# Patient Record
Sex: FEMALE | Race: BLACK OR AFRICAN AMERICAN | NOT HISPANIC OR LATINO | Employment: FULL TIME | URBAN - METROPOLITAN AREA
[De-identification: names, ages, dates, MRNs, and addresses within clinical notes are randomized per-mention and may not be internally consistent; named-entity substitution may affect disease eponyms.]

---

## 2023-04-05 ENCOUNTER — OFFICE VISIT (OUTPATIENT)
Dept: PRIMARY CARE | Facility: CLINIC | Age: 28
End: 2023-04-05
Payer: COMMERCIAL

## 2023-04-05 VITALS
BODY MASS INDEX: 18.63 KG/M2 | DIASTOLIC BLOOD PRESSURE: 77 MMHG | SYSTOLIC BLOOD PRESSURE: 115 MMHG | HEART RATE: 100 BPM | WEIGHT: 95.4 LBS | TEMPERATURE: 99 F | OXYGEN SATURATION: 98 %

## 2023-04-05 DIAGNOSIS — M25.512 ACUTE PAIN OF LEFT SHOULDER: ICD-10-CM

## 2023-04-05 DIAGNOSIS — Z3A.01 LESS THAN 8 WEEKS GESTATION OF PREGNANCY (HHS-HCC): ICD-10-CM

## 2023-04-05 DIAGNOSIS — Z11.3 ROUTINE SCREENING FOR STI (SEXUALLY TRANSMITTED INFECTION): Primary | ICD-10-CM

## 2023-04-05 LAB
POC APPEARANCE, URINE: CLEAR
POC BILIRUBIN, URINE: ABNORMAL
POC BLOOD, URINE: ABNORMAL
POC COLOR, URINE: YELLOW
POC GLUCOSE, URINE: NEGATIVE MG/DL
POC KETONES, URINE: ABNORMAL MG/DL
POC LEUKOCYTES, URINE: ABNORMAL
POC NITRITE,URINE: NEGATIVE
POC PH, URINE: 5.5 PH
POC PROTEIN, URINE: ABNORMAL MG/DL
POC SPECIFIC GRAVITY, URINE: >=1.03
POC UROBILINOGEN, URINE: 1 EU/DL
PREGNANCY TEST URINE, POC: POSITIVE

## 2023-04-05 PROCEDURE — 1036F TOBACCO NON-USER: CPT

## 2023-04-05 PROCEDURE — 99213 OFFICE O/P EST LOW 20 MIN: CPT

## 2023-04-05 PROCEDURE — 81025 URINE PREGNANCY TEST: CPT

## 2023-04-05 PROCEDURE — 87491 CHLMYD TRACH DNA AMP PROBE: CPT

## 2023-04-05 PROCEDURE — 87591 N.GONORRHOEAE DNA AMP PROB: CPT

## 2023-04-05 PROCEDURE — 81002 URINALYSIS NONAUTO W/O SCOPE: CPT

## 2023-04-05 RX ORDER — ALBUTEROL SULFATE 90 UG/1
AEROSOL, METERED RESPIRATORY (INHALATION)
COMMUNITY
Start: 2017-02-28

## 2023-04-05 ASSESSMENT — PAIN SCALES - GENERAL: PAINLEVEL: 5

## 2023-04-05 NOTE — PROGRESS NOTES
Subjective   Patient ID:   Juan Truong is a 28 y.o. female who presents for Abdominal Pain.  HPI  She reported that her LMP was 3/15/2023 and stated that she had unprotected intercourse on 3/25/2023. She reported that she usually takes a Plan B after but did not take one this time. She stated that she has been nauseous but denies any vomiting or abdominal pain. She reported that she was diagnosed with BV in the ED earlier in the year but was unable to finish her Flagyl course due to the vomiting. She took a pregnancy test at home which was positive. She stated that she had her last child a year ago and stated that she would not like to continue with this pregnancy and has already contacted the  clinic.     She also complained of some left shoulder pain that started a few months go. She reported that the pain comes and goes. 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 She describes it as pain behind and around her shoulder with weakness compared to her right arm. She has tried icy hot patches but has had little relief. Her ROM is limited by pain       Review of Systems   Constitutional:  Negative for appetite change, chills and fatigue.   Respiratory:  Negative for shortness of breath.    Cardiovascular:  Negative for chest pain.   Gastrointestinal:  Positive for nausea. Negative for abdominal pain, diarrhea and vomiting.       Objective   /77   Pulse 100   Temp 37.2 °C (99 °F)   Wt 43.3 kg (95 lb 6.4 oz)   SpO2 98%   BMI 18.63 kg/m²    Physical Exam  Constitutional:       General: She is not in acute distress.     Appearance: Normal appearance.   Cardiovascular:      Rate and Rhythm: Normal rate and regular rhythm.      Heart sounds: Normal heart sounds. No murmur heard.  Pulmonary:      Effort: Pulmonary effort is normal. No respiratory distress.      Breath sounds: Normal breath sounds.   Abdominal:      General: There is no distension.       Palpations: Abdomen is soft.      Tenderness: There is no abdominal tenderness. There is no guarding.   Musculoskeletal:         General: Normal range of motion.      Comments: ROM intact but limited due to pain, Rotor cuff maneuvers negative   Skin:     General: Skin is warm and dry.   Neurological:      General: No focal deficit present.      Mental Status: She is alert.   Psychiatric:         Mood and Affect: Mood normal.         Behavior: Behavior normal.         Assessment/Plan     Problem List Items Addressed This Visit    None  Ms. Truong is a 28 year old  who presents to the clinic for positive pregnancy test and left shoulder pain.    #Pregnancy  - IO HCG test positive  - IO UA showed trace LE and negative nitrites   - Ordered chlamydia & gonorrhea to screen  - Given handout with information on next steps of pregnancy and available options    - Suggested starting a prenatal vitamin as a precaution if patient decides to keep pregnancy    #Left shoulder pain  - Likely due to muscle strain  - Use ice, rest, & Tylenol as needed  - Referred for PT for pain/weakness  - Given a work note to allow patient to rest and recover     RTC in 3 weeks for a follow up visit    The patient was discussed with Dr. Iqbal.    Lukas Corrales MD  Family Medicine   PGY-1

## 2023-04-05 NOTE — LETTER
April 5, 2023     Patient: Juan Truong   YOB: 1995   Date of Visit: 4/5/2023       To Whom It May Concern:    Juan Truong was seen in my clinic on 4/5/2023 at 2:00 pm. Please excuse Juan for her absence from work on 4/6/2023 to 4/13/2023. She also has a follow up appointment in 3 weeks that is to be scheduled.    If you have any questions or concerns, please don't hesitate to call.         Sincerely,         Lukas Corrales MD        CC: No Recipients

## 2023-04-06 LAB
CHLAMYDIA TRACH., AMPLIFIED: NEGATIVE
N. GONORRHEA, AMPLIFIED: NEGATIVE

## 2023-04-10 ENCOUNTER — TELEPHONE (OUTPATIENT)
Dept: PRIMARY CARE | Facility: CLINIC | Age: 28
End: 2023-04-10
Payer: COMMERCIAL

## 2023-04-10 ASSESSMENT — ENCOUNTER SYMPTOMS
VOMITING: 0
DIARRHEA: 0
ABDOMINAL PAIN: 0
FATIGUE: 0
NAUSEA: 1
CHILLS: 0
APPETITE CHANGE: 0
SHORTNESS OF BREATH: 0

## 2023-04-10 NOTE — TELEPHONE ENCOUNTER
Result Communication    Resulted Orders   POCT Pregnancy, Urine manually resulted   Result Value Ref Range    Preg Test, Ur Positive (A) Negative   C. Trachomatis / N. Gonorrhoeae, Amplified Detection   Result Value Ref Range    Neisseria gonorrhea,Amplified NEGATIVE Negative      Comment:       The APTIMA Combo 2 assay is FDA-approved for Chlamydia    trachomatis and Neisseria gonorrhoeae testing on female    endocervical and vaginal swabs, ThinPrep liquid pap    samples, male urine samples and urethral swabs.    Performance characteristics for Chlamydia trachomatis and    Neisseria gonorrhoeae testing on specific non-FDA-approved    sample types (female urine samples) have been validated by    [facility]. This    laboratory is certified by CLIA to perform high complexity    testing. Samples from all other sites are not validated    for this method.    Chlamydia trachomatis, Amplified NEGATIVE Negative      Comment:       The APTIMA Combo 2 assay is FDA-approved for Chlamydia    trachomatis and Neisseria gonorrhoeae testing on female    endocervical and vaginal swabs, ThinPrep liquid pap    samples, male urine samples and urethral swabs.    Performance characteristics for Chlamydia trachomatis and    Neisseria gonorrhoeae testing on specific non-FDA-approved    sample types (female urine samples) have been validated by    [facility]. This    laboratory is certified by CLIA to perform high complexity    testing. Samples from all other sites are not validated    for this method.   POCT UA (nonautomated) manually resulted   Result Value Ref Range    POC Color, Urine Yellow Straw, Yellow, Light Yellow    POC Appearance, Urine Clear Clear    POC Specific Gravity, Urine >=1.030 1.005 - 1.035    POC PH, Urine 5.5 No Reference Range Established PH    POC Protein, Urine TRACE (A) NEGATIVE, 30 (1+) mg/dl    POC Glucose, Urine NEGATIVE NEGATIVE mg/dl    POC  Blood, Urine MODERATE (2+) (A) NEGATIVE    POC Ketones, Urine TRACE (A) NEGATIVE mg/dl    POC Bilirubin, Urine SMALL (1+) (A) NEGATIVE    POC Urobilinogen, Urine 1.0 0.2, 1.0 EU/DL    Poc Nitrate, Urine NEGATIVE NEGATIVE    POC Leukocytes, Urine TRACE (A) NEGATIVE       4:33 PM      Results were successfully communicated with the patient and they acknowledged their understanding.  The patient reported that she had sat down with the FOB and discussed the pregnancy and reported that she is thinking of keeping it but is still unsure at the moment. She requested some prenatal vitamins which I sent to her pharmacy. I told her to schedule a follow up appointment regardless of which decision she makes.

## 2023-04-13 NOTE — PROGRESS NOTES
I saw and evaluated the patient. I personally obtained the key and critical portions of the history and physical exam or was physically present for key and critical portions performed by the resident/fellow. I reviewed the resident/fellow's documentation and discussed the patient with the resident/fellow. I agree with the resident/fellow's medical decision making as documented in the note.  Referred to .    Renae Iqbal MD

## 2023-09-28 ENCOUNTER — OFFICE VISIT (OUTPATIENT)
Dept: PRIMARY CARE | Facility: CLINIC | Age: 28
End: 2023-09-28
Payer: COMMERCIAL

## 2023-09-28 VITALS
BODY MASS INDEX: 19.24 KG/M2 | HEIGHT: 60 IN | TEMPERATURE: 98.2 F | SYSTOLIC BLOOD PRESSURE: 110 MMHG | OXYGEN SATURATION: 99 % | WEIGHT: 98 LBS | HEART RATE: 89 BPM | DIASTOLIC BLOOD PRESSURE: 75 MMHG

## 2023-09-28 DIAGNOSIS — Z30.09 BIRTH CONTROL COUNSELING: ICD-10-CM

## 2023-09-28 DIAGNOSIS — L30.9 ECZEMA, UNSPECIFIED TYPE: ICD-10-CM

## 2023-09-28 DIAGNOSIS — Z11.3 ROUTINE SCREENING FOR STI (SEXUALLY TRANSMITTED INFECTION): Primary | ICD-10-CM

## 2023-09-28 DIAGNOSIS — B35.4 RINGWORM OF BODY: ICD-10-CM

## 2023-09-28 DIAGNOSIS — N76.0 BACTERIAL VAGINOSIS: ICD-10-CM

## 2023-09-28 DIAGNOSIS — T78.40XA ALLERGY, INITIAL ENCOUNTER: ICD-10-CM

## 2023-09-28 DIAGNOSIS — B96.89 BACTERIAL VAGINOSIS: ICD-10-CM

## 2023-09-28 DIAGNOSIS — Z3A.01 LESS THAN 8 WEEKS GESTATION OF PREGNANCY (HHS-HCC): ICD-10-CM

## 2023-09-28 LAB — PREGNANCY TEST URINE, POC: POSITIVE

## 2023-09-28 PROCEDURE — 87491 CHLMYD TRACH DNA AMP PROBE: CPT

## 2023-09-28 PROCEDURE — 1036F TOBACCO NON-USER: CPT

## 2023-09-28 PROCEDURE — 99395 PREV VISIT EST AGE 18-39: CPT

## 2023-09-28 PROCEDURE — 87661 TRICHOMONAS VAGINALIS AMPLIF: CPT

## 2023-09-28 PROCEDURE — 87591 N.GONORRHOEAE DNA AMP PROB: CPT

## 2023-09-28 PROCEDURE — 81025 URINE PREGNANCY TEST: CPT

## 2023-09-28 RX ORDER — KETOCONAZOLE 20 MG/G
CREAM TOPICAL 2 TIMES DAILY
Qty: 30 G | Refills: 0 | Status: SHIPPED | OUTPATIENT
Start: 2023-09-28 | End: 2023-10-28

## 2023-09-28 RX ORDER — KETOCONAZOLE 20 MG/ML
SHAMPOO, SUSPENSION TOPICAL 2 TIMES WEEKLY
Qty: 120 ML | Refills: 0 | Status: SHIPPED | OUTPATIENT
Start: 2023-09-28

## 2023-09-28 RX ORDER — METRONIDAZOLE 500 MG/1
500 TABLET ORAL 2 TIMES DAILY
Qty: 14 TABLET | Refills: 0 | Status: SHIPPED | OUTPATIENT
Start: 2023-09-28 | End: 2023-10-05

## 2023-09-28 RX ORDER — BETAMETHASONE VALERATE 1 MG/G
CREAM TOPICAL 2 TIMES DAILY
Qty: 15 G | Refills: 5 | Status: SHIPPED | OUTPATIENT
Start: 2023-09-28 | End: 2024-09-27

## 2023-09-28 RX ORDER — EPINEPHRINE 0.3 MG/.3ML
1 INJECTION SUBCUTANEOUS AS NEEDED
Qty: 1 EACH | Refills: 3 | Status: SHIPPED | OUTPATIENT
Start: 2023-09-28 | End: 2024-09-27

## 2023-09-28 ASSESSMENT — PATIENT HEALTH QUESTIONNAIRE - PHQ9
1. LITTLE INTEREST OR PLEASURE IN DOING THINGS: NOT AT ALL
SUM OF ALL RESPONSES TO PHQ9 QUESTIONS 1 AND 2: 0
2. FEELING DOWN, DEPRESSED OR HOPELESS: NOT AT ALL

## 2023-09-28 ASSESSMENT — ENCOUNTER SYMPTOMS
FEVER: 0
SHORTNESS OF BREATH: 0
FATIGUE: 0
BLOOD IN STOOL: 0
DIAPHORESIS: 0
NUMBNESS: 0
EYE PAIN: 0
ABDOMINAL DISTENTION: 0
COLOR CHANGE: 0
CHEST TIGHTNESS: 0
EYE ITCHING: 0
COUGH: 0
SINUS PRESSURE: 0
HEADACHES: 0
VOMITING: 0
NERVOUS/ANXIOUS: 1
OCCASIONAL FEELINGS OF UNSTEADINESS: 0
DYSURIA: 0
BACK PAIN: 0
AGITATION: 0
LOSS OF SENSATION IN FEET: 0
DIARRHEA: 0
ABDOMINAL PAIN: 0
JOINT SWELLING: 0
NAUSEA: 0
DEPRESSION: 0
FREQUENCY: 0
WEAKNESS: 0
RHINORRHEA: 0
NECK STIFFNESS: 0
FLANK PAIN: 0
TREMORS: 0
WHEEZING: 0
CONSTIPATION: 0
SINUS PAIN: 0
CONFUSION: 0
EYE DISCHARGE: 0
DIZZINESS: 0
NECK PAIN: 0
LIGHT-HEADEDNESS: 0

## 2023-09-28 ASSESSMENT — COLUMBIA-SUICIDE SEVERITY RATING SCALE - C-SSRS
1. IN THE PAST MONTH, HAVE YOU WISHED YOU WERE DEAD OR WISHED YOU COULD GO TO SLEEP AND NOT WAKE UP?: NO
2. HAVE YOU ACTUALLY HAD ANY THOUGHTS OF KILLING YOURSELF?: NO
6. HAVE YOU EVER DONE ANYTHING, STARTED TO DO ANYTHING, OR PREPARED TO DO ANYTHING TO END YOUR LIFE?: NO

## 2023-09-28 ASSESSMENT — PAIN SCALES - GENERAL: PAINLEVEL: 0-NO PAIN

## 2023-09-28 NOTE — PROGRESS NOTES
Subjective   Patient ID: Juan Truong is a 28 y.o. female who presents for Annual Exam.    HPI   # Body rash  - Reports rash on both arms, legs, thigh, & abdomen  - Started a few months ago and has never had a similar rash before  - Describes as small round dry patches   - Sometimes itchy, Non-tender  - Denies any recent changes to detergents, soaps, or clothes  - Has not tried anything for it    #Wrist nodule  - Nodule present on dorsum of the wrist  - Reports it has been present for the last few months  - Sometimes painful and not related to any strenuous activities  - Reports that it has been about the same size but sometimes gets bigger and shrinks back down  - Never had it before    # Vaginal Discharge  - Thick white discharge  - No odor, itching or UTI symptoms  - Believes she has Bacterial vaginosis as she had similar symptoms during an episode in the past    #Med refill  - Reported that she has not needed to use her epipen but the ones she has at home are  and needs a refill    #HM  - Social Hx: Never smoked, drinks alcohol socially, Denies other drug use. Lives with her 3 children at home, Childrens father is incarcerated. Feels safe at home, has family close by for support. Works as a [employer]  - Sexual Hx: Active with 1 male partner and uses protection sometimes. Positive pregnancy test at last visit and reported that she underwent an . Reports she is interested in birth control. Would like to be screened for STIs  - PHQ-2: Scored a 2 - negative, reports that speaking with her ex makes her feel down/angry  - Food Insecurity - answered no      Review of Systems   Constitutional:  Negative for diaphoresis, fatigue and fever.   HENT:  Negative for ear discharge, ear pain, mouth sores, postnasal drip, rhinorrhea, sinus pressure, sinus pain, sneezing and tinnitus.    Eyes:  Negative for pain, discharge and itching.   Respiratory:  Negative for cough, chest tightness, shortness of breath and  wheezing.    Gastrointestinal:  Negative for abdominal distention, abdominal pain, blood in stool, constipation, diarrhea, nausea and vomiting.   Endocrine: Positive for cold intolerance. Negative for heat intolerance.   Genitourinary:  Negative for dyspareunia, dysuria, flank pain, frequency, pelvic pain and urgency.   Musculoskeletal:  Negative for back pain, gait problem, joint swelling, neck pain and neck stiffness.   Skin:  Positive for rash. Negative for color change and pallor.   Neurological:  Negative for dizziness, tremors, syncope, weakness, light-headedness, numbness and headaches.   Psychiatric/Behavioral:  Negative for agitation, confusion, self-injury and suicidal ideas. The patient is nervous/anxious.        Objective   Ht 1.524 m (5')   Wt (!) 44.5 kg (98 lb)   BMI 19.14 kg/m²     Physical Exam    General: NAD.  Neck: No lymphadenopathy.  CV: RRR. Normal S1/S2. No murmurs, rubs, gallops.  Pulm: Clear.  Abd: Nontender, nondistended, NBS.  MSK: Full range of motiong, No joint redness, swelling, tendersness. 3 x 3 cm cyst on dorsum of right wrist, nontender to palpation  Keila: No tingling, numbness, sensory loss.  Skin: Multiple round dry patches. Little to no redness. Distributed on arms, groin, buttock, legs, face.      Assessment/Plan   Juan Truong is a 28 year old female who presents to the clinic for a  visit.    #Body Rash  - Likely fungal etiology with some eczema  - Ordered Ketoconazole Shampoo & cream  - Ordered betamethasone cream    #Ganglion cyst  - Continue to monitor  - Consider draining if symptoms progress    #BV  - Ordered Flagyl BID for 7 days    #Med refill  - Refilled Epipen    #HM  - Ordered STI screening; Chlamydia, gonorrhea, HIV, syphilis, & trichomonas  - Discussed different options of birth control, patient declined IUD, Depo, & Nexplanon but is interested in starting OCP as she has used it in the past.  - POCT HCG test positive, discussed options but patient expressed  that she does not have any plans to keep the baby. Plans to schedule appointment with Prenatal who she saw earlier in the year.   - Ordered Prenatal vitamins and plan to order OCP once patient is no longer pregnant  - Patient declined annual flu shot    The patient was seen and discussed with Dr. Washburn.    Robert Turcios, MS3    The history and physical were conducted with a medical student and I agree with the assessment and plan above.    Lukas Corrales MD  Family Medicine  PGY-2

## 2023-09-29 LAB
CHLAMYDIA TRACH., AMPLIFIED: NEGATIVE
N. GONORRHEA, AMPLIFIED: NEGATIVE
TRICHOMONAS VAGINALIS: NEGATIVE

## 2023-10-02 ENCOUNTER — TELEPHONE (OUTPATIENT)
Dept: PRIMARY CARE | Facility: CLINIC | Age: 28
End: 2023-10-02
Payer: COMMERCIAL

## 2023-10-02 NOTE — TELEPHONE ENCOUNTER
Result Communication    Resulted Orders   POCT Pregnancy, Urine manually resulted   Result Value Ref Range    Preg Test, Ur Positive (A) Negative   C. trachomatis + N. gonorrhoeae, Amplified   Result Value Ref Range    Neisseria gonorrhea,Amplified NEGATIVE Negative      Comment:       The APTIMA Combo 2 assay is FDA-approved for Chlamydia    trachomatis and Neisseria gonorrhoeae testing on female    endocervical and vaginal swabs, ThinPrep liquid pap    samples, male urine samples and urethral swabs.    Performance characteristics for Chlamydia trachomatis and    Neisseria gonorrhoeae testing on specific non-FDA-approved    sample types (female urine samples) have been validated by    [facility]. This    laboratory is certified by CLIA to perform high complexity    testing. Samples from all other sites are not validated    for this method.    Chlamydia trachomatis, Amplified NEGATIVE Negative      Comment:       The APTIMA Combo 2 assay is FDA-approved for Chlamydia    trachomatis and Neisseria gonorrhoeae testing on female    endocervical and vaginal swabs, ThinPrep liquid pap    samples, male urine samples and urethral swabs.    Performance characteristics for Chlamydia trachomatis and    Neisseria gonorrhoeae testing on specific non-FDA-approved    sample types (female urine samples) have been validated by    [facility]. This    laboratory is certified by CLIA to perform high complexity    testing. Samples from all other sites are not validated    for this method.   Trichomonas vaginalis, Amplified   Result Value Ref Range    Trichomonas Vaginalis NEGATIVE Negative      Comment:       The APTIMA Trichomonas vaginalis assay is FDA-approved for    testing on female endocervical swabs, vaginal swabs, and    ThinPrep liquid pap samples. Performance characteristics    for Trichomonas vaginalis on specific non-FDA-approved    sample types (female and  male urine and male urethral    swabs) have been validated by [facility]. This laboratory is certified by    CLIA to perform high complexity testing. Samples from all    other sites are not validated for this method.  Performance characteristics for Trichomonas Vaginalis testing on urine   samples has been validated by [facility].  Testing on   this sample type is not FDA-approved, but such approval is not necessary.   This laboratory is certified by CLIA to perform high complexity testing.       6:55 PM      Results were successfully communicated with the patient and they acknowledged their understanding.

## 2023-10-06 NOTE — PROGRESS NOTES
I saw and evaluated the patient. I personally obtained the key and critical portions of the history and physical exam or was physically present for key and critical portions performed by the resident/fellow. I reviewed the resident/fellow's documentation and discussed the patient with the resident/fellow. I agree with the resident/fellow's medical decision making as documented in the note.    Johann Washburn MD

## 2024-07-14 ENCOUNTER — HOSPITAL ENCOUNTER (EMERGENCY)
Facility: HOSPITAL | Age: 29
Discharge: HOME | End: 2024-07-14
Payer: COMMERCIAL

## 2024-07-14 VITALS
DIASTOLIC BLOOD PRESSURE: 85 MMHG | TEMPERATURE: 98.6 F | RESPIRATION RATE: 18 BRPM | HEART RATE: 80 BPM | WEIGHT: 102 LBS | OXYGEN SATURATION: 99 % | SYSTOLIC BLOOD PRESSURE: 122 MMHG | HEIGHT: 60 IN | BODY MASS INDEX: 20.03 KG/M2

## 2024-07-14 DIAGNOSIS — Z3A.01 LESS THAN 8 WEEKS GESTATION OF PREGNANCY (HHS-HCC): Primary | ICD-10-CM

## 2024-07-14 DIAGNOSIS — Z20.2 POSSIBLE EXPOSURE TO STD: ICD-10-CM

## 2024-07-14 LAB — HCG UR QL IA.RAPID: POSITIVE

## 2024-07-14 PROCEDURE — 87661 TRICHOMONAS VAGINALIS AMPLIF: CPT | Mod: AHULAB | Performed by: PHYSICIAN ASSISTANT

## 2024-07-14 PROCEDURE — 87491 CHLMYD TRACH DNA AMP PROBE: CPT | Mod: AHULAB | Performed by: PHYSICIAN ASSISTANT

## 2024-07-14 PROCEDURE — 81025 URINE PREGNANCY TEST: CPT | Performed by: PHYSICIAN ASSISTANT

## 2024-07-14 PROCEDURE — 99283 EMERGENCY DEPT VISIT LOW MDM: CPT

## 2024-07-14 ASSESSMENT — PAIN - FUNCTIONAL ASSESSMENT: PAIN_FUNCTIONAL_ASSESSMENT: 0-10

## 2024-07-14 ASSESSMENT — COLUMBIA-SUICIDE SEVERITY RATING SCALE - C-SSRS
6. HAVE YOU EVER DONE ANYTHING, STARTED TO DO ANYTHING, OR PREPARED TO DO ANYTHING TO END YOUR LIFE?: NO
2. HAVE YOU ACTUALLY HAD ANY THOUGHTS OF KILLING YOURSELF?: NO
1. IN THE PAST MONTH, HAVE YOU WISHED YOU WERE DEAD OR WISHED YOU COULD GO TO SLEEP AND NOT WAKE UP?: NO

## 2024-07-14 ASSESSMENT — PAIN SCALES - GENERAL: PAINLEVEL_OUTOF10: 0 - NO PAIN

## 2024-07-14 NOTE — ED PROVIDER NOTES
"HPI     CC: pregnancy test     HPI: Juan Truong is a 29 y.o. female with no past medical history presents with concern for wanting a pregnancy test.  Patient is slightly evasive to questioning and does not wish to tell me why.  She will not give me correct dates for last menstrual period but states that it was last month.  Later she shows that her last menstrual period was .  She has not had any bleeding or spotting.  Reports that this would be 6 pregnancy with 3 living children.  She does report that about 6 months ago she went to  and had an  completed.  She also asked for \"anything that can be tested with my urine to be tested.\"  Denies any urinary symptoms or vaginal discharge.    ROS: 10-point review of systems was performed and is otherwise negative except as noted in HPI.      Past Medical History: Noncontributory except per HPI     Past Surgical History: Noncontributory except per HPI     Family History: Reviewed and noncontributory     Social History:  Noncontributory except per HPI       Allergies   Allergen Reactions    Peanut Anaphylaxis    Chocolate Flavor Unknown    Cocoa Hives    Other Hives     peanut butter       Home Meds:   Current Outpatient Medications   Medication Instructions    ALBUTEROL 0.375 MG/ML CONT NEB SYR-SIMPLE inhalation    albuterol 90 mcg/actuation inhaler inhalation    betamethasone valerate (Valisone) 0.1 % cream Topical, 2 times daily, Apply to affected area    EPINEPHrine (EPIPEN) 0.3 mg, intramuscular, As needed, Call 911 after use.    ketoconazole (NIZOral) 2 % shampoo Topical, 2 times weekly, Shampoo daily, leave on for 5-10 minutes, then rinse.    prenatal vitamin, iron-folic, 27 mg iron-800 mcg folic acid tablet 1 tablet, oral, Daily        ED Triage Vitals [24 1023]   Temperature Heart Rate Respirations BP   37 °C (98.6 °F) 80 18 122/85      Pulse Ox Temp Source Heart Rate Source Patient Position   99 % Temporal Monitor Sitting      BP " Location FiO2 (%)     Left arm --         Heart Rate:  [80]   Temperature:  [37 °C (98.6 °F)]   Respirations:  [18]   BP: (122)/(85)   Height:  [152.4 cm (5')]   Weight:  [46.3 kg (102 lb)]   Pulse Ox:  [99 %]      Physical Exam:  Physical Exam  Vitals and nursing note reviewed.   Constitutional:       General: She is not in acute distress.     Appearance: Normal appearance. She is not ill-appearing.   HENT:      Head: Normocephalic.      Right Ear: External ear normal.      Left Ear: External ear normal.      Nose: Nose normal.      Mouth/Throat:      Mouth: Mucous membranes are moist.   Eyes:      Extraocular Movements: Extraocular movements intact.      Conjunctiva/sclera: Conjunctivae normal.      Pupils: Pupils are equal, round, and reactive to light.   Cardiovascular:      Rate and Rhythm: Normal rate and regular rhythm.      Pulses: Normal pulses.   Pulmonary:      Effort: Pulmonary effort is normal. No respiratory distress.   Abdominal:      General: Abdomen is flat.   Musculoskeletal:         General: Normal range of motion.      Cervical back: Normal range of motion and neck supple.   Skin:     General: Skin is warm and dry.      Capillary Refill: Capillary refill takes less than 2 seconds.   Neurological:      General: No focal deficit present.      Mental Status: She is alert and oriented to person, place, and time.   Psychiatric:         Mood and Affect: Mood normal.          Diagnostic Results        Labs Reviewed   HCG, URINE, QUALITATIVE - Abnormal       Result Value    HCG, Urine POSITIVE (*)    C. TRACHOMATIS + N. GONORRHOEAE, AMPLIFIED   TRICH VAGINALIS, AMPLIFIED         No orders to display                 No data recorded                Procedure  Procedures    ED Course & MDM   Assessment/Plan:     Medications - No data to display     Diagnoses as of 07/14/24 1226   Less than 8 weeks gestation of pregnancy ([facility])   Possible exposure to STD       Medical Decision Making    Juan Truong is  a 29 y.o. female with no significant past medical history presents with concern for pregnancy test.  Patient is nontoxic-appearing and her vital signs are normal.  With very brief history taken, differential diagnosis includes pregnancy, STDs.  Patient has no urinary symptoms so deferred urinalysis at this time.  Will obtain pregnancy test and urine test for chlamydia, gonorrhea, and trichomonas.  Urine pregnancy positive.  Patient notified.  She states she is going to go to .  We discussed that she is probably about 4 weeks gestational age given last menstrual period.  Since she has no acute symptoms at this time, do not feel that ultrasound would be of any assistance.  Patient states she is making an appointment and does not plan to keep this baby.  We discussed that her STD results will not come back today but that a post discharge nurse will call her within a few days.  If these are positive she will need treatment which she is aware of.  Does not wish to take any today since her pregnancy test did come back positive.  She also has low concern for STDs at this time.  Advised her to refrain from sexual activity over the next 7 days to avoid spreading of possible STDs.  Recommended that she return to the emergency department for any new or worsening symptoms.  Patient agreeable to plan of care and felt comfortable returning home.    Disposition: Home    ED Prescriptions    None         Social Determinants Affecting Care: none     Stella Morrell PA-C    This note was dictated by speech recognition. Minor errors in transcription may be present.     Stella Morrell PA-C  [id number]

## 2024-07-15 LAB
C TRACH RRNA SPEC QL NAA+PROBE: NEGATIVE
N GONORRHOEA DNA SPEC QL PROBE+SIG AMP: NEGATIVE
T VAGINALIS RRNA SPEC QL NAA+PROBE: NEGATIVE

## 2024-07-19 ENCOUNTER — APPOINTMENT (OUTPATIENT)
Dept: RADIOLOGY | Facility: HOSPITAL | Age: 29
End: 2024-07-19
Payer: COMMERCIAL

## 2024-07-19 ENCOUNTER — HOSPITAL ENCOUNTER (EMERGENCY)
Facility: HOSPITAL | Age: 29
Discharge: HOME | End: 2024-07-19
Attending: EMERGENCY MEDICINE
Payer: COMMERCIAL

## 2024-07-19 VITALS
HEIGHT: 60 IN | HEART RATE: 74 BPM | OXYGEN SATURATION: 99 % | BODY MASS INDEX: 20.03 KG/M2 | WEIGHT: 102 LBS | SYSTOLIC BLOOD PRESSURE: 110 MMHG | DIASTOLIC BLOOD PRESSURE: 71 MMHG | TEMPERATURE: 97.9 F | RESPIRATION RATE: 16 BRPM

## 2024-07-19 DIAGNOSIS — O03.9 MISCARRIAGE (HHS-HCC): Primary | ICD-10-CM

## 2024-07-19 LAB
ABO GROUP (TYPE) IN BLOOD: NORMAL
ALBUMIN SERPL BCP-MCNC: 4.2 G/DL (ref 3.4–5)
ALP SERPL-CCNC: 40 U/L (ref 33–110)
ALT SERPL W P-5'-P-CCNC: 11 U/L (ref 7–45)
ANION GAP SERPL CALC-SCNC: 11 MMOL/L (ref 10–20)
ANTIBODY SCREEN: NORMAL
APPEARANCE UR: ABNORMAL
AST SERPL W P-5'-P-CCNC: 13 U/L (ref 9–39)
B-HCG SERPL-ACNC: 35 MIU/ML
BACTERIA #/AREA URNS AUTO: ABNORMAL /HPF
BASOPHILS # BLD AUTO: 0.06 X10*3/UL (ref 0–0.1)
BASOPHILS NFR BLD AUTO: 0.7 %
BILIRUB SERPL-MCNC: 0.5 MG/DL (ref 0–1.2)
BILIRUB UR STRIP.AUTO-MCNC: NEGATIVE MG/DL
BUN SERPL-MCNC: 10 MG/DL (ref 6–23)
CALCIUM SERPL-MCNC: 8.8 MG/DL (ref 8.6–10.3)
CHLORIDE SERPL-SCNC: 104 MMOL/L (ref 98–107)
CO2 SERPL-SCNC: 26 MMOL/L (ref 21–32)
COLOR UR: ABNORMAL
CREAT SERPL-MCNC: 0.63 MG/DL (ref 0.5–1.05)
EGFRCR SERPLBLD CKD-EPI 2021: >90 ML/MIN/1.73M*2
EOSINOPHIL # BLD AUTO: 0.59 X10*3/UL (ref 0–0.7)
EOSINOPHIL NFR BLD AUTO: 6.8 %
ERYTHROCYTE [DISTWIDTH] IN BLOOD BY AUTOMATED COUNT: 12 % (ref 11.5–14.5)
GLUCOSE SERPL-MCNC: 91 MG/DL (ref 74–99)
GLUCOSE UR STRIP.AUTO-MCNC: NORMAL MG/DL
HCT VFR BLD AUTO: 38.1 % (ref 36–46)
HGB BLD-MCNC: 12.9 G/DL (ref 12–16)
IMM GRANULOCYTES # BLD AUTO: 0.04 X10*3/UL (ref 0–0.7)
IMM GRANULOCYTES NFR BLD AUTO: 0.5 % (ref 0–0.9)
KETONES UR STRIP.AUTO-MCNC: ABNORMAL MG/DL
LEUKOCYTE ESTERASE UR QL STRIP.AUTO: ABNORMAL
LYMPHOCYTES # BLD AUTO: 1.81 X10*3/UL (ref 1.2–4.8)
LYMPHOCYTES NFR BLD AUTO: 20.8 %
MCH RBC QN AUTO: 29.9 PG (ref 26–34)
MCHC RBC AUTO-ENTMCNC: 33.9 G/DL (ref 32–36)
MCV RBC AUTO: 88 FL (ref 80–100)
MONOCYTES # BLD AUTO: 0.59 X10*3/UL (ref 0.1–1)
MONOCYTES NFR BLD AUTO: 6.8 %
MUCOUS THREADS #/AREA URNS AUTO: ABNORMAL /LPF
NEUTROPHILS # BLD AUTO: 5.62 X10*3/UL (ref 1.2–7.7)
NEUTROPHILS NFR BLD AUTO: 64.4 %
NITRITE UR QL STRIP.AUTO: NEGATIVE
NRBC BLD-RTO: 0 /100 WBCS (ref 0–0)
PH UR STRIP.AUTO: 5.5 [PH]
PLATELET # BLD AUTO: 381 X10*3/UL (ref 150–450)
POTASSIUM SERPL-SCNC: 3.4 MMOL/L (ref 3.5–5.3)
PROT SERPL-MCNC: 7.1 G/DL (ref 6.4–8.2)
PROT UR STRIP.AUTO-MCNC: ABNORMAL MG/DL
RBC # BLD AUTO: 4.32 X10*6/UL (ref 4–5.2)
RBC # UR STRIP.AUTO: ABNORMAL /UL
RBC #/AREA URNS AUTO: >20 /HPF
RH FACTOR (ANTIGEN D): NORMAL
SODIUM SERPL-SCNC: 138 MMOL/L (ref 136–145)
SP GR UR STRIP.AUTO: 1.03
SQUAMOUS #/AREA URNS AUTO: ABNORMAL /HPF
UROBILINOGEN UR STRIP.AUTO-MCNC: ABNORMAL MG/DL
WBC # BLD AUTO: 8.7 X10*3/UL (ref 4.4–11.3)
WBC #/AREA URNS AUTO: ABNORMAL /HPF

## 2024-07-19 PROCEDURE — 80053 COMPREHEN METABOLIC PANEL: CPT

## 2024-07-19 PROCEDURE — 36415 COLL VENOUS BLD VENIPUNCTURE: CPT

## 2024-07-19 PROCEDURE — 81001 URINALYSIS AUTO W/SCOPE: CPT

## 2024-07-19 PROCEDURE — 76801 OB US < 14 WKS SINGLE FETUS: CPT

## 2024-07-19 PROCEDURE — 85025 COMPLETE CBC W/AUTO DIFF WBC: CPT

## 2024-07-19 PROCEDURE — 87086 URINE CULTURE/COLONY COUNT: CPT | Mod: AHULAB

## 2024-07-19 PROCEDURE — 86901 BLOOD TYPING SEROLOGIC RH(D): CPT

## 2024-07-19 PROCEDURE — 76817 TRANSVAGINAL US OBSTETRIC: CPT | Performed by: RADIOLOGY

## 2024-07-19 PROCEDURE — 84702 CHORIONIC GONADOTROPIN TEST: CPT

## 2024-07-19 PROCEDURE — 76801 OB US < 14 WKS SINGLE FETUS: CPT | Performed by: RADIOLOGY

## 2024-07-19 PROCEDURE — 99284 EMERGENCY DEPT VISIT MOD MDM: CPT | Mod: 25

## 2024-07-19 RX ORDER — ACETAMINOPHEN 325 MG/1
975 TABLET ORAL ONCE
Status: COMPLETED | OUTPATIENT
Start: 2024-07-19 | End: 2024-07-19

## 2024-07-19 ASSESSMENT — PAIN DESCRIPTION - FREQUENCY: FREQUENCY: CONSTANT/CONTINUOUS

## 2024-07-19 ASSESSMENT — PAIN DESCRIPTION - ONSET: ONSET: ONGOING

## 2024-07-19 ASSESSMENT — PAIN DESCRIPTION - LOCATION: LOCATION: PELVIS

## 2024-07-19 ASSESSMENT — PAIN SCALES - GENERAL: PAINLEVEL_OUTOF10: 8

## 2024-07-19 ASSESSMENT — PAIN - FUNCTIONAL ASSESSMENT: PAIN_FUNCTIONAL_ASSESSMENT: 0-10

## 2024-07-19 ASSESSMENT — PAIN DESCRIPTION - DESCRIPTORS: DESCRIPTORS: CRAMPING

## 2024-07-19 ASSESSMENT — PAIN DESCRIPTION - ORIENTATION: ORIENTATION: LOWER

## 2024-07-19 ASSESSMENT — PAIN DESCRIPTION - PAIN TYPE: TYPE: ACUTE PAIN

## 2024-07-19 NOTE — ED TRIAGE NOTES
Patient stated she believed she was having a miscarriage. The patient stated she had been bleeding and passing clots vaginally since approximately 11pm. The patient believed she was about 5 1/2 weeks pregnant. Lower pelvic pain 8/10.

## 2024-07-19 NOTE — DISCHARGE INSTRUCTIONS
See your family medicine doctor next week for repeat exam.  Take aleve or motrin for your cramping.  Rest today.  Ok to return to work tomorrow.   Return to the ER if you have severe bleeding (clots size of golf balls, or feeling lightheaded related to bleeding).

## 2024-07-19 NOTE — Clinical Note
Juan Truong was seen and treated in our emergency department on 7/19/2024.  She may return to work on 07/20/2024.       If you have any questions or concerns, please don't hesitate to call.      Tanya Kaiser MD

## 2024-07-19 NOTE — ED PROVIDER NOTES
HPI   Chief Complaint   Patient presents with    Miscarriage       HPI  HISTORY OF PRESENT ILLNESS:  29 y.o. female  (1 prior miscarriage, 1 prior elective ) at estimated 5 weeks gestation presenting to the ED with complaint of vaginal bleeding since 11pm last night. Has noticed the bleeding when she is using the bathroom. Has passed a few small clots. Also reports lower abdominal cramping which is relatively mild, feels like menstrual cramps. She denies severe or persistent abdominal or pelvic pain. Denies dizziness or lightheadedness, no syncope. No nausea or vomiting. No urinary symptoms. Denies any other vaginal complaints, no abdominal discharge, odor, itching, or pain, denies concern for STDs. No medications taken for her symptoms. History of one prior miscarriage, this feels similar to prior miscarriage. No chest pain or dyspnea. No other complaints or symptoms voiced.     12 point review of systems was performed and is negative unless otherwise specified in HPI.    PMH: asthma    Past Medical History:   Diagnosis Date    Achilles tendinitis, left leg 2016    Achilles tendinitis of left lower extremity    Acute vaginitis 2018    Bacterial vaginosis    Encounter for other general counseling and advice on contraception 2014    General counseling and advice for contraceptive management    Encounter for screening for malignant neoplasm of cervix     Cervical cancer screening    Encounter for supervision of normal pregnancy, unspecified, unspecified trimester ([facility]) 2014    Supervision of normal pregnancy    Encounter for supervision of other normal pregnancy, first trimester ([facility]) 2018    Prenatal care, subsequent pregnancy in first trimester    Nausea 09/10/2013    Nausea    Other specified abnormal findings of blood chemistry 2015    Low TSH level    Personal history of diseases of the skin and subcutaneous tissue 10/11/2013    History of atopic dermatitis     Personal history of other diseases of the female genital tract 04/12/2019    History of abnormal uterine bleeding    Personal history of other diseases of the female genital tract 05/09/2014    History of dysmenorrhea    Personal history of other endocrine, nutritional and metabolic disease 05/13/2015    History of hyperthyroidism    Pregnant state, incidental ([facility]) 01/06/2014    Pregnancy, incidental    Sexually transmitted chlamydial infection of other sites 07/18/2013    Venereal disease due to Chlamydia trachomatis         Abnormal Labs Reviewed - No abnormal labs to display   No orders to display           Patient History   Past Medical History:   Diagnosis Date    Achilles tendinitis, left leg 03/24/2016    Achilles tendinitis of left lower extremity    Acute vaginitis 01/23/2018    Bacterial vaginosis    Encounter for other general counseling and advice on contraception 05/09/2014    General counseling and advice for contraceptive management    Encounter for screening for malignant neoplasm of cervix     Cervical cancer screening    Encounter for supervision of normal pregnancy, unspecified, unspecified trimester ([facility]) 02/14/2014    Supervision of normal pregnancy    Encounter for supervision of other normal pregnancy, first trimester ([facility]) 06/25/2018    Prenatal care, subsequent pregnancy in first trimester    Nausea 09/10/2013    Nausea    Other specified abnormal findings of blood chemistry 05/13/2015    Low TSH level    Personal history of diseases of the skin and subcutaneous tissue 10/11/2013    History of atopic dermatitis    Personal history of other diseases of the female genital tract 04/12/2019    History of abnormal uterine bleeding    Personal history of other diseases of the female genital tract 05/09/2014    History of dysmenorrhea    Personal history of other endocrine, nutritional and metabolic disease 05/13/2015    History of hyperthyroidism    Pregnant state, incidental  ([facility]) 01/06/2014    Pregnancy, incidental    Sexually transmitted chlamydial infection of other sites 07/18/2013    Venereal disease due to Chlamydia trachomatis     Past Surgical History:   Procedure Laterality Date    OTHER SURGICAL HISTORY  03/24/2016    Prior Surgical Procedure Not Done     No family history on file.  Social History     Tobacco Use    Smoking status: Never    Smokeless tobacco: Never   Vaping Use    Vaping status: Never Used   Substance Use Topics    Alcohol use: Yes     Comment: socially    Drug use: Never       Physical Exam   ED Triage Vitals [07/19/24 0510]   Temperature Heart Rate Respirations BP   36.6 °C (97.9 °F) 74 16 112/73      Pulse Ox Temp Source Heart Rate Source Patient Position   100 % Temporal Monitor Sitting      BP Location FiO2 (%)     Right arm --       Physical Exam  Constitutional:       General: She is not in acute distress.     Appearance: She is not toxic-appearing.   HENT:      Head: Normocephalic and atraumatic.      Nose: No congestion.      Mouth/Throat:      Mouth: Mucous membranes are moist.   Eyes:      General: No scleral icterus.     Extraocular Movements: Extraocular movements intact.   Cardiovascular:      Rate and Rhythm: Normal rate and regular rhythm.      Pulses: Normal pulses.   Pulmonary:      Effort: Pulmonary effort is normal. No respiratory distress.   Abdominal:      General: There is no distension.      Palpations: Abdomen is soft.      Tenderness: There is no abdominal tenderness. There is no guarding.   Genitourinary:     Comments: Pelvic:  Chaperone present.  Normal external genitalia.  Small amount of blood in vaginal vault. No active hemorrhage or large volume hemorrhage. No laceration or foreign body. No cervical motion tenderness.  No adnexal tenderness.   Musculoskeletal:         General: Normal range of motion.      Cervical back: Normal range of motion and neck supple. No rigidity.   Skin:     General: Skin is warm and dry.       Capillary Refill: Capillary refill takes less than 2 seconds.   Neurological:      General: No focal deficit present.      Mental Status: She is alert and oriented to person, place, and time.      Gait: Gait normal.   Psychiatric:         Mood and Affect: Mood normal.         Behavior: Behavior normal.         Judgment: Judgment normal.       ED Course & MDM        Medical Decision Making  ED course / MDM     Summary:  Patient presented with vaginal bleeding and cramping, is estimated 5 weeks gestation based on last menstrual period.  Vital signs are stable, patient is very well-appearing.  Ambulates unassisted, no acute distress.  Abdomen is entirely soft and nontender.  Small amount of blood in the vaginal vault on pelvic exam, no active hemorrhage, nontender exam.  Orders placed for UA, labs, and pelvic ultrasound. Given a dose of tylenol.    Patient case signed out to oncoming attending Dr. Kaiser at 0600 due to shift change, pending labs, US, reevaluation, and final disposition.      This note has been transcribed using voice recognition and may contain grammatical errors, misplaced words, incorrect words, incorrect phrases or other errors.   Procedure  Procedures     Talia Ramos PA-C  07/19/24 1794

## 2024-07-19 NOTE — ED NOTES
Community Resource Name: [facility] a list of  primary care physicians  Phone Number:   Staff Member:  Barbra Velarde    Discussed the following topics on behalf of the patient:  []  Behavioral Health Assistance     []  Case Management  []   Assistance  []  Digital Equity Assistance  []  Dental Health Assistance  []  Education Assistance  []  Employment Assistance  []  Financial Strain Relief Assistance  []  Food Insecurity Assistance  [x]  Healthcare Coverage Assistance  []  Housing Stability Assistance  []  IP Violence Relief Assistance  []  Legal Assistance  []  Physical Activity Assistance  []  Social Connection Assistance  []  Stress Relief Assistance   []  Substance Abuse Assistance  []  Transportation Assistance  []  Utility Assistance  [x]  Other: [insert comment here]  Patient does not have a primary care physician  Next Steps:         [facility] Ac   CHW talked to patient regarding not having a primary care physician. CHW asked the patient if she would be interested in looking at a list of  physicians to choose from for future services. Patient denied services at this time.  Responsible Staff  [facility]  Department  [facility] ED  Targets     CECILY Ac  07/19/24 0808

## 2024-07-20 LAB — BACTERIA UR CULT: NORMAL

## 2024-07-22 DIAGNOSIS — O03.9 MISCARRIAGE (HHS-HCC): Primary | ICD-10-CM

## 2024-07-22 DIAGNOSIS — R35.0 URINARY FREQUENCY: ICD-10-CM

## 2024-07-29 ENCOUNTER — HOSPITAL ENCOUNTER (OUTPATIENT)
Dept: RADIOLOGY | Facility: HOSPITAL | Age: 29
Discharge: HOME | End: 2024-07-29
Payer: COMMERCIAL

## 2024-07-29 DIAGNOSIS — O03.9 MISCARRIAGE (HHS-HCC): ICD-10-CM

## 2024-07-29 PROCEDURE — 76830 TRANSVAGINAL US NON-OB: CPT | Performed by: RADIOLOGY

## 2024-07-29 PROCEDURE — 76856 US EXAM PELVIC COMPLETE: CPT | Performed by: RADIOLOGY

## 2024-07-29 PROCEDURE — 76856 US EXAM PELVIC COMPLETE: CPT

## 2024-08-16 ENCOUNTER — APPOINTMENT (OUTPATIENT)
Dept: PRIMARY CARE | Facility: CLINIC | Age: 29
End: 2024-08-16
Payer: COMMERCIAL

## 2024-08-16 VITALS
TEMPERATURE: 98 F | HEART RATE: 90 BPM | SYSTOLIC BLOOD PRESSURE: 117 MMHG | WEIGHT: 99 LBS | DIASTOLIC BLOOD PRESSURE: 79 MMHG | BODY MASS INDEX: 19.44 KG/M2 | OXYGEN SATURATION: 97 % | HEIGHT: 60 IN

## 2024-08-16 DIAGNOSIS — N89.8 VAGINAL ODOR: Primary | ICD-10-CM

## 2024-08-16 DIAGNOSIS — Z72.51 UNPROTECTED SEX: ICD-10-CM

## 2024-08-16 DIAGNOSIS — Z30.013 ENCOUNTER FOR INITIAL PRESCRIPTION OF INJECTABLE CONTRACEPTIVE: ICD-10-CM

## 2024-08-16 LAB — PREGNANCY TEST URINE, POC: NEGATIVE

## 2024-08-16 RX ORDER — MEDROXYPROGESTERONE ACETATE 150 MG/ML
150 INJECTION, SUSPENSION INTRAMUSCULAR ONCE
Status: COMPLETED | OUTPATIENT
Start: 2024-08-16 | End: 2024-08-16

## 2024-08-16 ASSESSMENT — ENCOUNTER SYMPTOMS
DEPRESSION: 0
OCCASIONAL FEELINGS OF UNSTEADINESS: 0
LOSS OF SENSATION IN FEET: 0

## 2024-08-16 ASSESSMENT — PAIN SCALES - GENERAL: PAINLEVEL: 0-NO PAIN

## 2024-08-16 ASSESSMENT — COLUMBIA-SUICIDE SEVERITY RATING SCALE - C-SSRS
2. HAVE YOU ACTUALLY HAD ANY THOUGHTS OF KILLING YOURSELF?: NO
1. IN THE PAST MONTH, HAVE YOU WISHED YOU WERE DEAD OR WISHED YOU COULD GO TO SLEEP AND NOT WAKE UP?: NO
6. HAVE YOU EVER DONE ANYTHING, STARTED TO DO ANYTHING, OR PREPARED TO DO ANYTHING TO END YOUR LIFE?: NO

## 2024-08-16 NOTE — PROGRESS NOTES
Subjective   Patient ID:   Juan Escoto is a 29 y.o. female who presents for Follow-up and Exposure to STD.  HPI    #Birth Control  - Reported her  wants more children abut she does not feel strongly about it and stated she will keep it if she is pregnant but if she is not then would like birth control  - Reported she has had unprotected sex since her miscarriage  - Reported she was on Depot for years and would like to restart it if she is not currently pregnant  - Denied any side effects from Depot  - Reported she has not had any bleeding since her miscarriage almost a month ago    #C/f BV  - Has had it in the past  - Denied discharge but has noticed an odor sometimes      Review of Systems   Genitourinary:  Negative for vaginal bleeding and vaginal discharge.       Objective   /79 (BP Location: Left arm, Patient Position: Sitting, BP Cuff Size: Adult)   Pulse 90   Temp 36.7 °C (98 °F) (Temporal)   Ht 1.524 m (5')   Wt (!) 44.9 kg (99 lb)   SpO2 97%   BMI 19.33 kg/m²    Physical Exam  Constitutional:       General: She is not in acute distress.     Appearance: Normal appearance. She is not ill-appearing.   HENT:      Head: Normocephalic and atraumatic.   Eyes:      Extraocular Movements: Extraocular movements intact.   Cardiovascular:      Rate and Rhythm: Normal rate and regular rhythm.      Heart sounds: Normal heart sounds. No murmur heard.     No friction rub. No gallop.   Pulmonary:      Effort: Pulmonary effort is normal. No respiratory distress.      Breath sounds: Normal breath sounds. No stridor. No wheezing, rhonchi or rales.   Abdominal:      General: There is no distension.      Palpations: Abdomen is soft. There is no mass.      Tenderness: There is no abdominal tenderness. There is no guarding or rebound.      Hernia: No hernia is present.   Musculoskeletal:         General: Normal range of motion.      Cervical back: Normal range of motion.   Skin:     General: Skin is warm and  dry.   Neurological:      General: No focal deficit present.      Mental Status: She is alert.   Psychiatric:         Mood and Affect: Mood normal.         Behavior: Behavior normal.         Assessment/Plan     Problem List Items Addressed This Visit    None  Juan Escoto is a 29 year old female who presents to the clinic for birth control.    #Birth control  - POCT urine hCG was negative  - Given Depot shot at this visit    #c/f BV  - Ordered Wet mount    RTC in 3 months for a nursing visit for next dose of depot.       The patient was discussed with Dr. Currie.    Lukas Corrales MD  Family Medicine   PGY-3

## 2024-08-24 NOTE — PROGRESS NOTES
I reviewed the resident/fellow's documentation and discussed the patient with the resident/fellow. I agree with the resident/fellow's medical decision making as documented in the note. I discussed but did not see the patient consistent with the Primary Care Exception.       Delia Currie MD